# Patient Record
Sex: FEMALE | Race: WHITE | Employment: UNEMPLOYED | URBAN - METROPOLITAN AREA
[De-identification: names, ages, dates, MRNs, and addresses within clinical notes are randomized per-mention and may not be internally consistent; named-entity substitution may affect disease eponyms.]

---

## 2022-12-18 ENCOUNTER — APPOINTMENT (OUTPATIENT)
Dept: CT IMAGING | Age: 77
End: 2022-12-18
Payer: COMMERCIAL

## 2022-12-18 ENCOUNTER — HOSPITAL ENCOUNTER (EMERGENCY)
Age: 77
Discharge: HOME | End: 2022-12-18
Payer: COMMERCIAL

## 2022-12-18 VITALS
BODY MASS INDEX: 23.95 KG/M2 | DIASTOLIC BLOOD PRESSURE: 77 MMHG | TEMPERATURE: 98.3 F | SYSTOLIC BLOOD PRESSURE: 132 MMHG | HEIGHT: 65 IN | OXYGEN SATURATION: 97 % | WEIGHT: 143.74 LBS | HEART RATE: 74 BPM | RESPIRATION RATE: 18 BRPM

## 2022-12-18 DIAGNOSIS — S05.12XA CONTUSION OF LEFT ORBIT, INITIAL ENCOUNTER: ICD-10-CM

## 2022-12-18 DIAGNOSIS — S05.11XA CONTUSION OF RIGHT ORBIT, INITIAL ENCOUNTER: ICD-10-CM

## 2022-12-18 DIAGNOSIS — S00.83XA CONTUSION OF FACE, INITIAL ENCOUNTER: ICD-10-CM
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
## 2022-12-18 DIAGNOSIS — S02.2XXA CLOSED FRACTURE OF NASAL BONE, INITIAL ENCOUNTER: ICD-10-CM

## 2022-12-18 PROCEDURE — 72125 CT NECK SPINE W/O DYE: CPT

## 2022-12-18 PROCEDURE — 99284 EMERGENCY DEPT VISIT MOD MDM: CPT

## 2022-12-18 PROCEDURE — 70450 CT HEAD/BRAIN W/O DYE: CPT

## 2022-12-18 PROCEDURE — 70486 CT MAXILLOFACIAL W/O DYE: CPT

## 2022-12-18 RX ORDER — BISOPROLOL FUMARATE AND HYDROCHLOROTHIAZIDE 10; 6.25 MG/1; MG/1
1 TABLET ORAL DAILY
COMMUNITY

## 2022-12-18 RX ORDER — TRAMADOL HYDROCHLORIDE 50 MG/1
50 TABLET ORAL EVERY 6 HOURS PRN
Qty: 12 TABLET | Refills: 0 | Status: SHIPPED | OUTPATIENT
Start: 2022-12-18 | End: 2022-12-21

## 2022-12-18 ASSESSMENT — ENCOUNTER SYMPTOMS
VOMITING: 0
SHORTNESS OF BREATH: 0
COUGH: 0
BACK PAIN: 0
ABDOMINAL PAIN: 0
EYE PAIN: 0
SORE THROAT: 0
NAUSEA: 0

## 2022-12-18 ASSESSMENT — LIFESTYLE VARIABLES
HOW OFTEN DO YOU HAVE A DRINK CONTAINING ALCOHOL: MONTHLY OR LESS
HOW MANY STANDARD DRINKS CONTAINING ALCOHOL DO YOU HAVE ON A TYPICAL DAY: 1 OR 2

## 2022-12-18 ASSESSMENT — PAIN DESCRIPTION - PAIN TYPE: TYPE: ACUTE PAIN

## 2022-12-18 ASSESSMENT — PAIN SCALES - GENERAL
PAINLEVEL_OUTOF10: 8
PAINLEVEL_OUTOF10: 8

## 2022-12-18 ASSESSMENT — PAIN DESCRIPTION - DESCRIPTORS: DESCRIPTORS: ACHING

## 2022-12-18 ASSESSMENT — PAIN DESCRIPTION - FREQUENCY: FREQUENCY: CONTINUOUS

## 2022-12-18 ASSESSMENT — VISUAL ACUITY
OD: 20/30
OS: 20/30

## 2022-12-18 ASSESSMENT — PAIN DESCRIPTION - LOCATION: LOCATION: FACE;HEAD

## 2022-12-18 NOTE — DISCHARGE INSTRUCTIONS
Continue icing source 5 nasal bone and facial area 3-4 times a day 20 minutes on  Follow-up with ENT Dr. Bouchra Riggins for nasal fracture  Take OTC Tylenol as needed for pain. Take prescribed medication as prescribed for pain only if needed. Follow-up with primary care provider as needed. Return emergency room for any worsening.

## 2022-12-18 NOTE — ED PROVIDER NOTES
**ADVANCED PRACTICE PROVIDER, I HAVE EVALUATED THIS PATIENT**        [facility] ENCOUNTER      Pt Name: Odette Jackson  [facility] [place]:[id number]  [place] 1945  Date of evaluation: 12/18/2022  Provider: Michelle Tanner PA-C  Note Started: 2:12 PM EST 12/18/2022        Chief Complaint:    Chief Complaint   Patient presents with    Facial Injury     Pt fell over dog in house 3 days ago, hit face on floor, denies loss of consciousness, denies n/v. Denies blood thinner medication. Nursing Notes, Past Medical Hx, Past Surgical Hx, Social Hx, Allergies, and Family Hx were all reviewed and agreed with or any disagreements were addressed in the HPI.    HPI: (Location, Duration, Timing, Severity, Quality, Assoc Sx, Context, Modifying factors)    Chief Complaint of fall. Patient states she was running in her house and she stopped real fast and she slipped and fell. She states she was chasing after dog. She was wearing tight. Did not have shoes on. The floor was slippery. This happened on Thursday. Complain of bruising to her face. Sore nose. Denies loss of consciousness. No neck or back pain. No numbness or tingling of feet or finger. No chest pain. Says she did have some slight ringing in her ear. And initially she had some blurred vision for a few minutes and then it cleared up. She has noticeable bruising underneath both eyes and on the bridge of her nose. She has no other complaints. She has been taking Tylenol for pain. This is a  68 y.o. female who presents to the emergency room with the above complaint. PastMedical/Surgical History:  History reviewed. No pertinent past medical history. History reviewed. No pertinent surgical history.     Medications:  Previous Medications    BISOPROLOL-HYDROCHLOROTHIAZIDE (ZIAC) 10-6.25 MG PER TABLET    Take 1 tablet by mouth daily         Review of Systems:  (2-9 systems needed)  Review of Systems Constitutional:  Negative for chills and fever. HENT:  Negative for congestion and sore throat. Eyes:  Negative for pain and visual disturbance. Respiratory:  Negative for cough and shortness of breath. Cardiovascular:  Negative for chest pain and leg swelling. Gastrointestinal:  Negative for abdominal pain, nausea and vomiting. Genitourinary:  Negative for dysuria and frequency. Musculoskeletal:  Negative for back pain and neck pain. Skin:  Negative for rash and wound. Neurological:  Negative for dizziness and light-headedness. \"Positives and Pertinent negatives as per HPI\"    Physical Exam:  Physical Exam  Vitals and nursing note reviewed. Constitutional:       Appearance: She is well-developed. She is not diaphoretic. HENT:      Head: Normocephalic. Raccoon eyes and contusion present. Jaw: No swelling. Nose: Signs of injury and nasal tenderness present. No nasal deformity or septal deviation. Right Nostril: No foreign body, epistaxis or septal hematoma. Left Nostril: No foreign body, epistaxis or septal hematoma. Mouth/Throat:      Mouth: Mucous membranes are moist.      Pharynx: Oropharynx is clear. No oropharyngeal exudate or posterior oropharyngeal erythema. Eyes:      General:         Right eye: No discharge. Left eye: No discharge. Cardiovascular:      Rate and Rhythm: Normal rate and regular rhythm. Heart sounds: Normal heart sounds. No murmur heard. No friction rub. No gallop. Pulmonary:      Effort: Pulmonary effort is normal. No respiratory distress. Breath sounds: Normal breath sounds. No wheezing or rales. Chest:      Chest wall: No tenderness. Musculoskeletal:         General: Normal range of motion. Cervical back: Normal, normal range of motion and neck supple. Thoracic back: Normal.      Lumbar back: Normal.   Skin:     General: Skin is warm and dry.    Neurological:      Mental Status: She is alert and oriented to person, place, and time. Psychiatric:         Behavior: Behavior normal.       MEDICAL DECISION MAKING    Vitals:    Vitals:    12/18/22 1335   BP: 132/77   Pulse: 74   Resp: 18   Temp: 98.3 °F (36.8 °C)   TempSrc: Oral   SpO2: 97%   Weight: 143 lb 11.8 oz (65.2 kg)   Height: 5' 4.5\" (1.638 m)       LABS:Labs Reviewed - No data to display     Remainder of labs reviewed and were negative at this time or not returned at the time of this note. RADIOLOGY:   Non-plain film images such as CT, Ultrasound and MRI are read by the radiologist. Gerber Figueroa PA-C have directly visualized the radiologic plain film image(s) with the below findings:      Interpretation per the Radiologist below, if available at the time of this note:    CT HEAD WO CONTRAST   Final Result   No acute intracranial abnormality. There is a minimally displaced left nasal bone fracture. CT FACIAL BONES WO CONTRAST   Final Result   Nondisplaced left nasal bone fracture. CT CERVICAL SPINE WO CONTRAST   Final Result   No acute abnormality of the cervical spine. No results found. No results found. MEDICAL DECISION MAKING / ED COURSE:      PROCEDURES:   Procedures    None    Patient was given:  Medications - No data to display    Emergency room course: Patient on exam pupils are equal round and reactive to light extraocular movement is intact. Scalp show no hematoma. Neck was supple full range of motion no midline tenderness. She has no midline tenderness cervical, thoracic or lumbar spine. She has no facial drooping no slurred speech noted. She has bruising noted over both right and left more raccoon eye. Bruising over the bridge of her nose. No obvious nasal deformity noted. No septal hematoma noted. Bilateral TMs are clear with no hemocompendium. Cardiovascular regular rate rhythm, lungs are clear. No wheeze rales or rhonchi noted. No chest wall tenderness.   Pelvic stable anterolateral compression. Negative straight leg raise bilaterally. Good strength against resisted plantar and dorsiflexion.  strength 5+ equal bilaterally. Full range of motion upper and lower extremity. She is ambulatory with no difficulty. Does not appear to be in acute distress. CT cervical spine showed no acute cervical abnormalities. CT facial bone CT of head shows no acute intracranial abnormality does show a nondisplaced left nasal bone fracture. Did discuss with patient her imaging results today. Apply ice over her orbital hematoma as well as her nose. Follow-up with ENT for nasal bone fracture. Follow-up primary care provider as needed. She will be discharged stable condition. I will give her a few tramadol for breakthrough pain. The patient tolerated their visit well. I evaluated the patient. The physician was available for consultation as needed. The patient and / or the family were informed of the results of any tests, a time was given to answer questions, a plan was proposed and they agreed with plan. I am the Primary Clinician of Record. CLINICAL IMPRESSION:  1. Fall, initial encounter    2. Contusion of left orbit, initial encounter    3. Contusion of right orbit, initial encounter    4. Closed fracture of nasal bone, initial encounter    5. Contusion of face, initial encounter        DISPOSITION Decision To Discharge 12/18/2022 02:45:58 PM      PATIENT REFERRED TO:  Abdelrahman Palomino MD  [address] [place] [address] [id number]  [place] [phone]    Call   As needed    SARAH Egan [address]  [facility] [facility]  [phone]    Call in 1 week      DISCHARGE MEDICATIONS:  New Prescriptions    TRAMADOL (ULTRAM) 50 MG TABLET    Take 1 tablet by mouth every 6 hours as needed for Pain (MAY TAKE 2 TABS EVERY 6 HOURS FOR SEVERE PAIN) for up to 3 days.        DISCONTINUED MEDICATIONS:  Discontinued Medications    No medications on file (Please note the MDM and HPI sections of this note were completed with a voice recognition program.  Efforts were made to edit the dictations but occasionally words are mis-transcribed.)    Electronically signed, Christian Earl PA-C,          Christian Earl PA-C  12/18/22 [id number]

## 2022-12-18 NOTE — ED NOTES
Dc'd to home  Awake alert  Skin warm and dry  To follow up with pmd  Walked out with ease  resp easy  Aware how to use meds       Katerina Novoa, RN  12/18/22 [id number]